# Patient Record
Sex: MALE | URBAN - NONMETROPOLITAN AREA
[De-identification: names, ages, dates, MRNs, and addresses within clinical notes are randomized per-mention and may not be internally consistent; named-entity substitution may affect disease eponyms.]

---

## 2023-05-25 ENCOUNTER — APPOINTMENT (RX ONLY)
Dept: URBAN - NONMETROPOLITAN AREA CLINIC 4 | Facility: CLINIC | Age: 68
Setting detail: DERMATOLOGY
End: 2023-05-25

## 2023-05-25 PROBLEM — D48.5 NEOPLASM OF UNCERTAIN BEHAVIOR OF SKIN: Status: ACTIVE | Noted: 2023-05-25

## 2023-05-25 PROCEDURE — ? TREATMENT REGIMEN

## 2023-05-25 PROCEDURE — ? SHAVE REMOVAL

## 2023-05-25 PROCEDURE — ? COUNSELING

## 2023-05-25 PROCEDURE — 11310 SHAVE SKIN LESION 0.5 CM/<: CPT

## 2023-05-25 NOTE — PROCEDURE: SHAVE REMOVAL
Medical Necessity Information: It is in your best interest to select a reason for this procedure from the list below. All of these items fulfill various CMS LCD requirements except the new and changing color options.
Medical Necessity Clause: This procedure was medically necessary because the lesion that was treated was:
Lab: 6
Lab Facility: 3
Body Location Override (Optional - Billing Will Still Be Based On Selected Body Map Location If Applicable): Right medial malar cheek
Detail Level: Detailed
Was A Bandage Applied: Yes
Size Of Lesion In Cm (Required): 0.4
X Size Of Lesion In Cm (Optional): 0
Depth Of Shave: dermis
Biopsy Method: Dermablade
Anesthesia Type: 1% lidocaine with epinephrine
Anesthesia Volume In Cc: 0.3
Hemostasis: Aluminum Chloride and Electrocautery
Wound Care: Petrolatum
Path Notes (To The Dermatopathologist): Please check margins.
Render Path Notes In Note?: No
Consent was obtained from the patient. The risks and benefits to therapy were discussed in detail. Specifically, the risks of infection, scarring, bleeding, prolonged wound healing, incomplete removal, allergy to anesthesia, nerve injury and recurrence were addressed. Prior to the procedure, the treatment site was clearly identified and confirmed by the patient. All components of Universal Protocol/PAUSE Rule completed.
Post-Care Instructions: I reviewed with the patient in detail post-care instructions. Patient is to keep the biopsy site dry overnight, and then apply bacitracin twice daily until healed. Patient may apply hydrogen peroxide soaks to remove any crusting.
Notification Instructions: Patient will be notified of pathology results. However, patient instructed to call the office if not contacted within 2 weeks.
Billing Type: Third-Party Bill

## 2023-07-10 ENCOUNTER — APPOINTMENT (RX ONLY)
Dept: URBAN - NONMETROPOLITAN AREA CLINIC 4 | Facility: CLINIC | Age: 68
Setting detail: DERMATOLOGY
End: 2023-07-10

## 2023-07-10 DIAGNOSIS — L57.0 ACTINIC KERATOSIS: ICD-10-CM

## 2023-07-10 DIAGNOSIS — D18.0 HEMANGIOMA: ICD-10-CM

## 2023-07-10 DIAGNOSIS — Z85.828 PERSONAL HISTORY OF OTHER MALIGNANT NEOPLASM OF SKIN: ICD-10-CM

## 2023-07-10 DIAGNOSIS — D22 MELANOCYTIC NEVI: ICD-10-CM

## 2023-07-10 DIAGNOSIS — L57.8 OTHER SKIN CHANGES DUE TO CHRONIC EXPOSURE TO NONIONIZING RADIATION: ICD-10-CM

## 2023-07-10 PROBLEM — C44.319 BASAL CELL CARCINOMA OF SKIN OF OTHER PARTS OF FACE: Status: ACTIVE | Noted: 2023-07-10

## 2023-07-10 PROBLEM — D22.5 MELANOCYTIC NEVI OF TRUNK: Status: ACTIVE | Noted: 2023-07-10

## 2023-07-10 PROBLEM — D18.01 HEMANGIOMA OF SKIN AND SUBCUTANEOUS TISSUE: Status: ACTIVE | Noted: 2023-07-10

## 2023-07-10 PROCEDURE — 99213 OFFICE O/P EST LOW 20 MIN: CPT | Mod: 25

## 2023-07-10 PROCEDURE — ? LIQUID NITROGEN

## 2023-07-10 PROCEDURE — ? COUNSELING

## 2023-07-10 PROCEDURE — 17000 DESTRUCT PREMALG LESION: CPT

## 2023-07-10 PROCEDURE — ? DEFER

## 2023-07-10 PROCEDURE — ? OTHER

## 2023-07-10 PROCEDURE — ? FULL BODY SKIN EXAM

## 2023-07-10 ASSESSMENT — LOCATION SIMPLE DESCRIPTION DERM
LOCATION SIMPLE: RIGHT SHOULDER
LOCATION SIMPLE: LEFT SHOULDER
LOCATION SIMPLE: RIGHT CHEEK
LOCATION SIMPLE: RIGHT LOWER BACK
LOCATION SIMPLE: ABDOMEN
LOCATION SIMPLE: UPPER BACK

## 2023-07-10 ASSESSMENT — LOCATION DETAILED DESCRIPTION DERM
LOCATION DETAILED: RIGHT CENTRAL MALAR CHEEK
LOCATION DETAILED: INFERIOR THORACIC SPINE
LOCATION DETAILED: LEFT POSTERIOR SHOULDER
LOCATION DETAILED: RIGHT POSTERIOR SHOULDER
LOCATION DETAILED: RIGHT SUPERIOR MEDIAL MIDBACK
LOCATION DETAILED: SUPERIOR THORACIC SPINE
LOCATION DETAILED: PERIUMBILICAL SKIN

## 2023-07-10 ASSESSMENT — LOCATION ZONE DERM
LOCATION ZONE: ARM
LOCATION ZONE: FACE
LOCATION ZONE: TRUNK

## 2023-07-10 NOTE — PROCEDURE: LIQUID NITROGEN
Number Of Freeze-Thaw Cycles: 1 freeze-thaw cycle
Show Aperture Variable?: Yes
Detail Level: Zone
Application Tool (Optional): Cry-AC
Render Note In Bullet Format When Appropriate: No
Consent: The patient's consent was obtained including but not limited to risks of crusting, scabbing, blistering, scarring, darker or lighter pigmentary change, recurrence, incomplete removal and infection.
Post-Care Instructions: I reviewed with the patient in detail post-care instructions. Patient is to wear sunprotection, and avoid picking at any of the treated lesions. Pt may apply Vaseline to crusted or scabbing areas.
Duration Of Freeze Thaw-Cycle (Seconds): 3

## 2023-07-10 NOTE — PROCEDURE: DEFER
Introduction Text (Please End With A Colon): Defer:
Detail Level: Detailed
X Size Of Lesion In Cm (Optional): 0
Procedure To Be Performed At Next Visit: Excision

## 2023-07-10 NOTE — PROCEDURE: OTHER
Note Text (......Xxx Chief Complaint.): This diagnosis correlates with the
Detail Level: Zone
Render Risk Assessment In Note?: no
Other (Free Text): Discussed MOHS vs excision in the office with myself and Dr. Terrell. Both procedures were reviewed with the patient. Patient wants to discuss with wife. Excision scheduled Sept 7th will leave on schedule at this time.

## 2023-10-31 ENCOUNTER — EVALUATION (OUTPATIENT)
Dept: PHYSICAL THERAPY | Facility: CLINIC | Age: 68
End: 2023-10-31
Payer: MEDICARE

## 2023-10-31 DIAGNOSIS — R42 VERTIGO: Primary | ICD-10-CM

## 2023-10-31 PROCEDURE — 97162 PT EVAL MOD COMPLEX 30 MIN: CPT | Performed by: PHYSICAL THERAPIST

## 2023-10-31 NOTE — PROGRESS NOTES
PT Evaluation     Today's date: 10/31/2023  Patient name: Lorenda Councilman  : 1955  MRN: [id number]  Referring provider: Crow Walters DO  Dx:   Encounter Diagnosis     ICD-10-CM    1. Vertigo  R42           Start Time: 0536  Stop Time: 1550  Total time in clinic (min): 35 minutes    Assessment  Assessment details: The patient is a 76 yo male who presents to physical therapy with signs and symptoms consistent with vertigo. He notes a long history of intermittent vertigo. Many years ago he had physical therapy treatment for BPPV with good results. He began experiencing bouts of vertigo last week when bending over and laying down in bed. He states he has not any episodes this week. Oculomotor function is normal. Saccades are WNL. Static balance EO and EC is normal. Positional testing is negative today. He has been instructed to contact the PT clinic if the vertigo returns and he will be reassessed and treated accordingly. Other impairment: Vertigo  Understanding of Dx/Px/POC: good   Prognosis: good    Goals  STG (1 week)   1. Patient will reports no episodes of vertigo for 1 week   2. Patient will resume all daily activities with no vertigo    LTG (3 weeks)   1. Patient will remain vertigo free   2. Return to PLOF   3. Patient will be independent with HEP      Plan  Plan details: Patient will contact PT clinic is symptoms return and he will be reassessed and treated at that time. Patient would benefit from: skilled physical therapy  Planned therapy interventions: canalith repositioning, balance, neuromuscular re-education and home exercise program  Frequency: 1-2x/week. Duration in weeks: 3  Plan of Care beginning date: 10/31/2023  Plan of Care expiration date: 2023  Treatment plan discussed with: patient        Subjective Evaluation    History of Present Illness  Mechanism of injury: The patient reports a several year history of occasional vertigo. He had PT which helped with the vertigo. The vertigo came back and his doctor prescribed meclizine. His last bout of vertigo was last week. He describes a spinning sensation when laying down in bed. He saw his PCP and is now referred to OPPT. Recurrent probem    Patient Goals  Patient goal: stop the vertigo  Pain  No pain reported    Social Support  Stairs in house: yes   Lives in: multiple-level home  Lives with: spouse    Employment status: not working        Objective     Concurrent Complaints  Positive for tinnitus and hearing loss. Negative for headaches, nausea/motion sickness, visual change, memory loss, aural fullness, poor concentration and peripheral neuropathy    Active Range of Motion   Cervical/Thoracic Spine       Cervical    Flexion: Neck active flexion: WFL. Extension: Neck active extension: WFL. Left rotation: Neck active rotation left: WFL. Right rotation: Neck active rotation right: WFL. Neuro Exam:     Dizziness  Positive for vertigo and light-headedness. Negative for disequilibrium, oscillopsia, motion sickness, rocking or swaying, diplopia and floating or swimming. Exacerbating factors  Positive for bending over, rolling in bed, looking up, turning head and supine to/from sitting. Negative for walking, optokinetic movement and walking in busy environment.      Headaches   Patient reports headaches: No.     Oculomotor exam   Oculomotor ROM: WNL  Resting nystagmus: not present   Gaze holding nystagmus: not present left  and not present right  Smooth pursuits: within normal limits  Vertical saccades: normal  Horizontal saccades: normal  Convergence: normal  Head thrust: left normal and right normal    Positional testing   [place]-Hallpike   Left posterior canal: WNL  Right posterior canal: WNL  Roll test   Left horizontal canal: WNL  Right horizontal canal: WNL      Balance assessments   MCTSIB   Eyes open level surface: 30 sec  Eyes closed level surface: 30 sec             Precautions: N/A       10/31 Manuals                                       Assessment JM            Neuro Re-Ed             Canalith repositioning                                                                                           Ther Ex                                                                                                                     Ther Activity                                       Gait Training                                       Modalities

## 2023-10-31 NOTE — LETTER
2023    Valarie Cole DO  [address]  [address] [facility] [address]    Patient: Navin Mathew   YOB: 1955   Date of Visit: 10/31/2023     Encounter Diagnosis     ICD-10-CM    1. Vertigo  R42           Dear Dr. Cesar Gray: Thank you for your recent referral of Navin Mathew. Please review the attached evaluation summary from [facility] recent visit. Please verify that you agree with the plan of care by signing the attached order. If you have any questions or concerns, please do not hesitate to call. I sincerely appreciate the opportunity to share in the care of one of your patients and hope to have another opportunity to work with you in the near future. Sincerely,    Amaris Campos, PT      Referring Provider:      I certify that I have read the below Plan of Care and certify the need for these services furnished under this plan of treatment while under my care. Valarie Cole DO  [address]  [address] [facility] [address]  Via Fax: [phone]          PT Evaluation     Today's date: 10/31/2023  Patient name: Navin Mathew  : 1955  MRN: [id number]  Referring provider: Valarie Cole DO  Dx:   Encounter Diagnosis     ICD-10-CM    1. Vertigo  R42           Start Time: 543  Stop Time: 1550  Total time in clinic (min): 35 minutes    Assessment  Assessment details: The patient is a 76 yo male who presents to physical therapy with signs and symptoms consistent with vertigo. He notes a long history of intermittent vertigo. Many years ago he had physical therapy treatment for BPPV with good results. He began experiencing bouts of vertigo last week when bending over and laying down in bed. He states he has not any episodes this week. Oculomotor function is normal. Saccades are WNL. Static balance EO and EC is normal. Positional testing is negative today.   He has been instructed to contact the PT clinic if the vertigo returns and he will be reassessed and treated accordingly. Other impairment: Vertigo  Understanding of Dx/Px/POC: good   Prognosis: good    Goals  STG (1 week)   1. Patient will reports no episodes of vertigo for 1 week   2. Patient will resume all daily activities with no vertigo    LTG (3 weeks)   1. Patient will remain vertigo free   2. Return to PLOF   3. Patient will be independent with HEP      Plan  Plan details: Patient will contact PT clinic is symptoms return and he will be reassessed and treated at that time. Patient would benefit from: skilled physical therapy  Planned therapy interventions: canalith repositioning, balance, neuromuscular re-education and home exercise program  Frequency: 1-2x/week. Duration in weeks: 3  Plan of Care beginning date: 10/31/2023  Plan of Care expiration date: 11/21/2023  Treatment plan discussed with: patient        Subjective Evaluation    History of Present Illness  Mechanism of injury: The patient reports a several year history of occasional vertigo. He had PT which helped with the vertigo. The vertigo came back and his doctor prescribed meclizine. His last bout of vertigo was last week. He describes a spinning sensation when laying down in bed. He saw his PCP and is now referred to OPPT. Recurrent probem    Patient Goals  Patient goal: stop the vertigo  Pain  No pain reported    Social Support  Stairs in house: yes   Lives in: multiple-level home  Lives with: spouse    Employment status: not working        Objective     Concurrent Complaints  Positive for tinnitus and hearing loss. Negative for headaches, nausea/motion sickness, visual change, memory loss, aural fullness, poor concentration and peripheral neuropathy    Active Range of Motion   Cervical/Thoracic Spine       Cervical    Flexion: Neck active flexion: WFL. Extension: Neck active extension: WFL. Left rotation: Neck active rotation left: WFL.   Right rotation: Neck active rotation right: WFL. Neuro Exam:     Dizziness  Positive for vertigo and light-headedness. Negative for disequilibrium, oscillopsia, motion sickness, rocking or swaying, diplopia and floating or swimming. Exacerbating factors  Positive for bending over, rolling in bed, looking up, turning head and supine to/from sitting. Negative for walking, optokinetic movement and walking in busy environment.      Headaches   Patient reports headaches: No.     Oculomotor exam   Oculomotor ROM: WNL  Resting nystagmus: not present   Gaze holding nystagmus: not present left  and not present right  Smooth pursuits: within normal limits  Vertical saccades: normal  Horizontal saccades: normal  Convergence: normal  Head thrust: left normal and right normal    Positional testing   [place]-Hallpike   Left posterior canal: WNL  Right posterior canal: WNL  Roll test   Left horizontal canal: WNL  Right horizontal canal: WNL      Balance assessments   MCTSIB   Eyes open level surface: 30 sec  Eyes closed level surface: 30 sec             Precautions: N/A       10/31            Manuals                                       Assessment JM            Neuro Re-Ed             Canalith repositioning                                                                                           Ther Ex                                                                                                                     Ther Activity                                       Gait Training                                       Modalities

## 2024-07-14 ENCOUNTER — APPOINTMENT (OUTPATIENT)
Dept: URGENT CARE | Facility: CLINIC | Age: 69
End: 2024-07-14
Payer: MEDICARE

## 2024-07-14 ENCOUNTER — OFFICE VISIT (OUTPATIENT)
Dept: URGENT CARE | Facility: CLINIC | Age: 69
End: 2024-07-14
Payer: MEDICARE

## 2024-07-14 VITALS
OXYGEN SATURATION: 99 % | HEART RATE: 66 BPM | SYSTOLIC BLOOD PRESSURE: 150 MMHG | DIASTOLIC BLOOD PRESSURE: 90 MMHG | TEMPERATURE: 98 F | RESPIRATION RATE: 18 BRPM

## 2024-07-14 DIAGNOSIS — H66.41 SUPPURATIVE OTITIS MEDIA OF RIGHT EAR, UNSPECIFIED CHRONICITY: Primary | ICD-10-CM

## 2024-07-14 PROCEDURE — 99212 OFFICE O/P EST SF 10 MIN: CPT

## 2024-07-14 PROCEDURE — G0463 HOSPITAL OUTPT CLINIC VISIT: HCPCS

## 2024-07-14 RX ORDER — LISINOPRIL 5 MG/1
5 TABLET ORAL DAILY
COMMUNITY
Start: 2024-04-15

## 2024-07-14 RX ORDER — AMOXICILLIN 500 MG/1
500 CAPSULE ORAL EVERY 12 HOURS SCHEDULED
Qty: 20 CAPSULE | Refills: 0 | Status: SHIPPED | OUTPATIENT
Start: 2024-07-14 | End: 2024-07-14

## 2024-07-14 RX ORDER — AMOXICILLIN 500 MG/1
1000 CAPSULE ORAL EVERY 12 HOURS SCHEDULED
Qty: 20 CAPSULE | Refills: 0 | Status: SHIPPED | OUTPATIENT
Start: 2024-07-14 | End: 2024-07-19

## 2024-07-14 RX ORDER — DORZOLAMIDE HYDROCHLORIDE AND TIMOLOL MALEATE 20; 5 MG/ML; MG/ML
SOLUTION/ DROPS OPHTHALMIC
COMMUNITY
Start: 2024-07-09

## 2024-07-14 NOTE — PATIENT INSTRUCTIONS
Wait 2-3 days to take amoxicillin as prescribed\  Note that ear discomfort from fluid may persist for up to one month  Fluids and rest  Tylenol/Ibuprofen for discomfort     Over the counter decongestants as needed   Follow up with PCP in 3-5 days.  Proceed to  ER if symptoms worsen.    If tests are performed, our office will contact you with results only if changes need to made to the care plan discussed with you at the visit. You can review your full results on [facility].

## 2024-07-14 NOTE — PROGRESS NOTES
[facility]        NAME: Erick Ayoub is a 69 y.o. male  : 1955    MRN: [id number]  DATE: 2024  TIME: 3:19 PM    Assessment and Plan   Suppurative otitis media of right ear, unspecified chronicity [H66.41]  1. Suppurative otitis media of right ear, unspecified chronicity  amoxicillin (AMOXIL) 500 mg capsule    DISCONTINUED: amoxicillin (AMOXIL) 500 mg capsule            Patient Instructions     Wait 2-3 days to take amoxicillin as prescribed\  Note that ear discomfort from fluid may persist for up to one month  Fluids and rest  Tylenol/Ibuprofen for discomfort     Over the counter decongestants as needed   Follow up with PCP in 3-5 days.  Proceed to  ER if symptoms worsen.    If tests are performed, our office will contact you with results only if changes need to made to the care plan discussed with you at the visit. You can review your full results on [facility].    Chief Complaint     Chief Complaint   Patient presents with    Earache     Right ear  Started 3 days ago         History of Present Illness       Patient presents with right ear pain for the last 3 days. Denies fever         Review of Systems   Review of Systems   Constitutional:  Negative for fever.   HENT:  Positive for ear pain.          Current Medications       Current Outpatient Medications:     amoxicillin (AMOXIL) 500 mg capsule, Take 2 capsules (1,000 mg total) by mouth every 12 (twelve) hours for 5 days, Disp: 20 capsule, Rfl: 0    dorzolamide-timolol (COSOPT) 2-0.5 % ophthalmic solution, , Disp: , Rfl:     lisinopril (ZESTRIL) 5 mg tablet, Take 5 mg by mouth daily, Disp: , Rfl:     Current Allergies     Allergies as of 2024    (No Known Allergies)            The following portions of the patient's history were reviewed and updated as appropriate: allergies, current medications, past family history, past medical history, past social history, past surgical history and problem list.     Past Medical  History:   Diagnosis Date    Hypertension        Past Surgical History:   Procedure Laterality Date    CATARACT EXTRACTION Bilateral     TONSILLECTOMY         No family history on file.      Medications have been verified.        Objective   /90   Pulse 66   Temp 98 °F (36.7 °C)   Resp 18   SpO2 99%        Physical Exam     Physical Exam  Vitals and nursing note reviewed.   Constitutional:       Appearance: Normal appearance.   HENT:      Head: Normocephalic.      Right Ear: Tenderness present. Tympanic membrane is erythematous.      Left Ear: Tympanic membrane, ear canal and external ear normal.      Nose: No congestion or rhinorrhea.      Mouth/Throat:      Mouth: Mucous membranes are moist.      Pharynx: No oropharyngeal exudate or posterior oropharyngeal erythema.   Eyes:      Conjunctiva/sclera: Conjunctivae normal.      Pupils: Pupils are equal, round, and reactive to light.   Cardiovascular:      Rate and Rhythm: Normal rate and regular rhythm.      Pulses: Normal pulses.      Heart sounds: Normal heart sounds. No murmur heard.  Pulmonary:      Effort: Pulmonary effort is normal.      Breath sounds: Normal breath sounds. No wheezing or rhonchi.   Abdominal:      General: Abdomen is flat. Bowel sounds are normal.      Palpations: Abdomen is soft.      Tenderness: There is no abdominal tenderness.   Musculoskeletal:         General: Normal range of motion.      Cervical back: Normal range of motion.   Lymphadenopathy:      Cervical: No cervical adenopathy.   Skin:     General: Skin is warm and dry.   Neurological:      General: No focal deficit present.      Mental Status: He is alert and oriented to person, place, and time.